# Patient Record
Sex: MALE | Race: WHITE | NOT HISPANIC OR LATINO | Employment: STUDENT | URBAN - METROPOLITAN AREA
[De-identification: names, ages, dates, MRNs, and addresses within clinical notes are randomized per-mention and may not be internally consistent; named-entity substitution may affect disease eponyms.]

---
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
## 2023-03-08 ENCOUNTER — NURSE TRIAGE (OUTPATIENT)
Dept: NURSING | Facility: CLINIC | Age: 18
End: 2023-03-08
Payer: OTHER GOVERNMENT

## 2023-03-08 ENCOUNTER — APPOINTMENT (OUTPATIENT)
Dept: URGENT CARE | Facility: URGENT CARE | Age: 18
End: 2023-03-08
Attending: PHYSICIAN ASSISTANT
Payer: OTHER GOVERNMENT

## 2023-03-08 ENCOUNTER — VIRTUAL VISIT (OUTPATIENT)
Dept: FAMILY MEDICINE | Facility: CLINIC | Age: 18
End: 2023-03-08
Payer: OTHER GOVERNMENT

## 2023-03-08 DIAGNOSIS — R07.0 THROAT PAIN: Primary | ICD-10-CM

## 2023-03-08 PROBLEM — M25.512 SHOULDER PAIN, LEFT: Status: ACTIVE | Noted: 2022-08-01

## 2023-03-08 PROBLEM — J38.3 VOCAL CORD DYSFUNCTION: Status: ACTIVE | Noted: 2021-09-13

## 2023-03-08 PROBLEM — M25.551 RIGHT HIP PAIN: Status: ACTIVE | Noted: 2020-11-10

## 2023-03-08 LAB
DEPRECATED S PYO AG THROAT QL EIA: NEGATIVE
GROUP A STREP BY PCR: NOT DETECTED

## 2023-03-08 PROCEDURE — 99213 OFFICE O/P EST LOW 20 MIN: CPT | Mod: VID | Performed by: PHYSICIAN ASSISTANT

## 2023-03-08 PROCEDURE — 87651 STREP A DNA AMP PROBE: CPT | Mod: VID | Performed by: PHYSICIAN ASSISTANT

## 2023-03-08 ASSESSMENT — ENCOUNTER SYMPTOMS
NECK STIFFNESS: 0
RESPIRATORY NEGATIVE: 1
SORE THROAT: 1
ACTIVITY CHANGE: 1
CARDIOVASCULAR NEGATIVE: 1
TROUBLE SWALLOWING: 0
NAUSEA: 1
NECK PAIN: 0
FEVER: 1

## 2023-03-08 NOTE — PROGRESS NOTES
Tien is a 17 year old who is being evaluated via a billable video visit.      How would you like to obtain your AVS? MyChart  If the video visit is dropped, the invitation should be resent by: Text to cell phone: [phone]  Will anyone else be joining your video visit? No        Assessment & Plan     Throat pain  Antibiotic therapy with Tylenol ibuprofen lots of fluids warm salt water gargles we will treat if positive for strep  - Streptococcus A Rapid Screen w/Reflex to PCR - Clinic Collect  Results for orders placed or performed in visit on 03/08/23   Streptococcus A Rapid Screen w/Reflex to PCR - Clinic Collect     Status: Normal    Specimen: Throat; Swab   Result Value Ref Range    Group A Strep antigen Negative Negative                      No follow-ups on file.    JAYJAY Naranjo  [facility]    Brianne Chauhan is a 17 year old, presenting for the following health issues:  Pharyngitis      17-year-old [employer] student presents virtually for sore throat she had a sore throat the last 24 to 36 hours she had a low-grade temperature most recently a little over 99 but it is with ibuprofen on board he was nauseous earlier not now no severe headache no rash no stiff neck had strep in the past and this feels similar no known exposure to strep that he is aware of no known COVID exposure no other respiratory symptoms she is requesting strep testing    History of Present Illness       Reason for visit:  Sore Throat and Fever  Symptom onset:  1-3 days ago  Symptoms include:  Sore Throat, Fever, Nausea, Throat Pain, Flu like symptoms  Symptom intensity:  Moderate  Symptom progression:  Worsening  Had these symptoms before:  Yes  Has tried/received treatment for these symptoms:  Yes  Previous treatment was successful:  Yes  Prior treatment description:  I had past issues with throat pain/sore throat (step, bacterial infection, viral infection). Medication I took was  used to combat bacteria infection  What makes it worse:  N/a  What makes it better:  Ibuprofen, pain/fever medication    He eats 2-3 servings of fruits and vegetables daily.He consumes 1 sweetened beverage(s) daily.He exercises with enough effort to increase his heart rate 30 to 60 minutes per day.  He exercises with enough effort to increase his heart rate 4 days per week.   He is taking medications regularly.       Onset: yesterday  Description: Sore throat, painful with swallowing, chills, fever, post nasal drainage  Intensity: moderate  Progression of Symptoms:  worsening  Therapies tried and outcome: ibuprofen, hydration, soft foods.        Review of Systems   Constitutional: Positive for activity change and fever.   HENT: Positive for sore throat. Negative for trouble swallowing.    Respiratory: Negative.    Cardiovascular: Negative.    Gastrointestinal: Positive for nausea.   Musculoskeletal: Negative for neck pain and neck stiffness.   Skin: Negative for rash.            Objective           Vitals:  No vitals were obtained today due to virtual visit.    Physical Exam   GENERAL: Healthy, alert and no distress  EYES: Eyes grossly normal to inspection.  No discharge or erythema, or obvious scleral/conjunctival abnormalities.  RESP: No audible wheeze, cough, or visible cyanosis.  No visible retractions or increased work of breathing.    SKIN: Visible skin clear. No significant rash, abnormal pigmentation or lesions.  NEURO: Cranial nerves grossly intact.  Mentation and speech appropriate for age.  PSYCH: Mentation appears normal, affect normal/bright, judgement and insight intact, normal speech and appearance well-groomed.                Video-Visit Details    Type of service:  Video Visit     Originating Location (pt. Location): Home    Distant Location (provider location):  On-site  Platform used for Video Visit: Visualant

## 2023-03-08 NOTE — TELEPHONE ENCOUNTER
"Patient calling reporting symptoms starting yesterday with a sore throat and fatigue.    Patient denies known exposure to COVID 19, influenza, strep.    Reporting history of strep with similar symptoms. Patient is requesting strep testing.    Sore throat pain rating  \"6-8\" on 1-10 pain scale. Ibuprofen and Tylenol \"helps a little.\"    Temp 99.2 (O) during triage, last dose of fever reducing medication taken at 1 a.m.    Patient agrees to complete My Chart Visit now with next available provider.    Caller verbalized understanding. Denies further questions. Connected to My Chart Support.    Brissa Ceron RN  [facility]       Reason for Disposition    Patient requesting a strep throat test    Additional Information    Negative: SEVERE difficulty breathing (e.g., struggling for each breath, speaks in single words)    Negative: Sounds like a life-threatening emergency to the triager    Negative: Drooling or spitting out saliva (because can't swallow)    Negative: Unable to open mouth completely    Negative: Drinking very little and has signs of dehydration (e.g., no urine > 12 hours, very dry mouth, very lightheaded)    Negative: Patient sounds very sick or weak to the triager    Negative: Difficulty breathing (per caller) but not severe    Negative: Fever > 103 F (39.4 C)    Negative: Refuses to drink anything for > 12 hours    Negative: SEVERE sore throat pain    Negative: Pus on tonsils (back of throat) and swollen neck lymph nodes ('glands')    Negative: Earache also present    Negative: Widespread rash (especially chest and abdomen)    Negative: Diabetes mellitus or weak immune system (e.g., HIV positive, cancer chemo, splenectomy, organ transplant, chronic steroids)    Negative: History of rheumatic fever    Negative: Patient wants to be seen    Negative: Fever present > 3 days (72 hours)    Protocols used: SORE THROAT-A-OH        "

## 2023-03-10 ENCOUNTER — OFFICE VISIT (OUTPATIENT)
Dept: FAMILY MEDICINE | Facility: CLINIC | Age: 18
End: 2023-03-10
Payer: OTHER GOVERNMENT

## 2023-03-10 VITALS
HEART RATE: 71 BPM | SYSTOLIC BLOOD PRESSURE: 116 MMHG | TEMPERATURE: 97.7 F | WEIGHT: 171 LBS | HEIGHT: 72 IN | DIASTOLIC BLOOD PRESSURE: 74 MMHG | BODY MASS INDEX: 23.16 KG/M2 | OXYGEN SATURATION: 98 %

## 2023-03-10 DIAGNOSIS — H11.31 SUBCONJUNCTIVAL HEMORRHAGE OF RIGHT EYE: ICD-10-CM

## 2023-03-10 DIAGNOSIS — J03.90 TONSILLITIS: Primary | ICD-10-CM

## 2023-03-10 PROCEDURE — 99213 OFFICE O/P EST LOW 20 MIN: CPT | Performed by: INTERNAL MEDICINE

## 2023-03-10 RX ORDER — AMOXICILLIN 500 MG/1
500 CAPSULE ORAL 2 TIMES DAILY
Qty: 20 CAPSULE | Refills: 0 | Status: SHIPPED | OUTPATIENT
Start: 2023-03-10 | End: 2023-03-20

## 2023-03-10 ASSESSMENT — ENCOUNTER SYMPTOMS
TROUBLE SWALLOWING: 1
COUGH: 0
SORE THROAT: 1
VOMITING: 0
SINUS PAIN: 0
CHILLS: 0
SHORTNESS OF BREATH: 0
EYE PAIN: 0
HEADACHES: 0
NAUSEA: 0
MYALGIAS: 0
DIARRHEA: 0
FATIGUE: 0
EYE DISCHARGE: 0
SINUS PRESSURE: 1
FEVER: 0

## 2023-03-10 ASSESSMENT — ASTHMA QUESTIONNAIRES
QUESTION_4 LAST FOUR WEEKS HOW OFTEN HAVE YOU USED YOUR RESCUE INHALER OR NEBULIZER MEDICATION (SUCH AS ALBUTEROL): NOT AT ALL
QUESTION_3 LAST FOUR WEEKS HOW OFTEN DID YOUR ASTHMA SYMPTOMS (WHEEZING, COUGHING, SHORTNESS OF BREATH, CHEST TIGHTNESS OR PAIN) WAKE YOU UP AT NIGHT OR EARLIER THAN USUAL IN THE MORNING: NOT AT ALL
QUESTION_2 LAST FOUR WEEKS HOW OFTEN HAVE YOU HAD SHORTNESS OF BREATH: NOT AT ALL
ACT_TOTALSCORE: 24
QUESTION_5 LAST FOUR WEEKS HOW WOULD YOU RATE YOUR ASTHMA CONTROL: WELL CONTROLLED
ACT_TOTALSCORE: 24
QUESTION_1 LAST FOUR WEEKS HOW MUCH OF THE TIME DID YOUR ASTHMA KEEP YOU FROM GETTING AS MUCH DONE AT WORK, SCHOOL OR AT HOME: NONE OF THE TIME

## 2023-03-10 NOTE — PROGRESS NOTES
Assessment & Plan     Tonsillitis  Patient with 3-day illness which is improving.  Has had recurrent tonsillitis.  Continue supportive care.  Rosa the risks of antibiotic therapy including allergy and C. difficile return if not better.  - amoxicillin (AMOXIL) 500 MG capsule; Take 1 capsule (500 mg) by mouth 2 times daily for 10 days    Subconjunctival hemorrhage of right eye  Thinks it was due to the 2 episodes of vomiting he had with the onset of his illness.  No visual impairment or pain.                   No follow-ups on file.    Carlos Chowdary MD  [facility]    Brianne Chauhan is a 17 year old accompanied by his self, presenting for the following health issues:  Pharyngitis (Negative strep yesterday, a lot of drainage in back of throat), Ear Problem (Pressure in R ear ), and Eye Problem (R eye redness )      HPI   Patient was well until 3 days ago when he developed fever chills pharyngitis nasal congestion postnasal drainage right ear discomfort.  Minimal cough with postnasal drip.  Fever and chills resolved as did the vomiting after 2 episodes.  He is actually feeling better.  Has had recurrent tonsillitis on a couple of occasions.      Review of Systems   Constitutional: Negative for chills, fatigue and fever.   HENT: Positive for ear pain, postnasal drip, sinus pressure, sore throat and trouble swallowing. Negative for congestion and sinus pain.    Eyes: Negative for pain, discharge and visual disturbance.   Respiratory: Negative for cough and shortness of breath.    Cardiovascular: Negative for chest pain and peripheral edema.   Gastrointestinal: Negative for diarrhea, nausea and vomiting.   Musculoskeletal: Negative for myalgias.   Neurological: Negative for headaches.            Objective    /74 (BP Location: Right arm)   Pulse 71   Temp 97.7  F (36.5  C)   Ht 1.829 m (6')   Wt 77.6 kg (171 lb)   SpO2 98%   BMI 23.19 kg/m    Body mass index is 23.19  kg/m .  Physical Exam   Healthy-appearing young man in no distress.  Appears comfortable.  Eyes reveals some conjunctival hemorrhage right lower lateral with intact pupillary reactions and EOMI.  HEENT exam reveals little bit of exudate on the tonsils which are not particularly erythematous.  TMs without erythema.  Sinuses nontender.  Lungs clear.

## 2023-05-21 ENCOUNTER — HEALTH MAINTENANCE LETTER (OUTPATIENT)
Age: 18
End: 2023-05-21

## 2023-10-22 ENCOUNTER — HEALTH MAINTENANCE LETTER (OUTPATIENT)
Age: 18
End: 2023-10-22

## 2024-02-02 ENCOUNTER — LAB (OUTPATIENT)
Dept: LAB | Facility: CLINIC | Age: 19
End: 2024-02-02
Attending: PHYSICIAN ASSISTANT
Payer: OTHER GOVERNMENT

## 2024-02-02 ENCOUNTER — VIRTUAL VISIT (OUTPATIENT)
Dept: FAMILY MEDICINE | Facility: CLINIC | Age: 19
End: 2024-02-02
Payer: OTHER GOVERNMENT

## 2024-02-02 DIAGNOSIS — J02.9 SORE THROAT: Primary | ICD-10-CM

## 2024-02-02 DIAGNOSIS — J02.9 SORE THROAT: ICD-10-CM

## 2024-02-02 LAB
DEPRECATED S PYO AG THROAT QL EIA: NEGATIVE
GROUP A STREP BY PCR: NOT DETECTED

## 2024-02-02 PROCEDURE — 99213 OFFICE O/P EST LOW 20 MIN: CPT | Mod: 95 | Performed by: PHYSICIAN ASSISTANT

## 2024-02-02 PROCEDURE — 87651 STREP A DNA AMP PROBE: CPT

## 2024-02-02 NOTE — RESULT ENCOUNTER NOTE
Dear Tien    Both your rapid and culture were negative for step.  If symptoms persist or worsen over the weekend, you may want to hop into an urgent care, as mono can have very similar symptoms to strep.  Otherwise, would continue tylenol, motrin, salt water gargles and rest.    Grupo Cerna PA-C

## 2024-02-02 NOTE — PROGRESS NOTES
Tien is a 18 year old who is being evaluated via a billable video visit.      How would you like to obtain your AVS? MyChart  If the video visit is dropped, the invitation should be resent by: Send to e-mail at: [email or web address]  Will anyone else be joining your video visit? No          Assessment & Plan     Sore throat  Negative strep.  Supportive care                  Subjective   Tien is a 18 year old, presenting for the following health issues:  Pharyngitis        2/2/2024     9:44 AM   Additional Questions   Roomed by Amanda     hospitals     Acute Illness  Acute illness concerns: Pharyngitis   Onset/Duration: 2/1/23  Symptoms:  Fever: No  Chills/Sweats: YES  Headache (location?): No  Sinus Pressure: No  Conjunctivitis:  No  Ear Pain: no  Rhinorrhea: No  Congestion: No  Sore Throat: YES  Cough: no  Wheeze: No  Decreased Appetite: No  Nausea: YES  Vomiting: YES  Diarrhea: No  Dysuria/Freq.: No  Dysuria or Hematuria: No  Fatigue/Achiness: YES  Sick/Strep Exposure: YES- School   Therapies tried and outcome: None        Review of Systems  Constitutional, HEENT, cardiovascular, pulmonary, gi and gu systems are negative, except as otherwise noted.      Objective           Vitals:  No vitals were obtained today due to virtual visit.    Physical Exam   GENERAL: alert and no distress  EYES: Eyes grossly normal to inspection.  No discharge or erythema, or obvious scleral/conjunctival abnormalities.  RESP: No audible wheeze, cough, or visible cyanosis.    SKIN: Visible skin clear. No significant rash, abnormal pigmentation or lesions.  NEURO: Cranial nerves grossly intact.  Mentation and speech appropriate for age.  PSYCH: Appropriate affect, tone, and pace of words    Results for orders placed or performed in visit on 02/02/24   Streptococcus A Rapid Screen w/Reflex to PCR     Status: Normal    Specimen: Throat; Swab   Result Value Ref Range    Group A Strep antigen Negative Negative   Group A Streptococcus PCR  Throat Swab     Status: Normal    Specimen: Throat; Swab   Result Value Ref Range    Group A strep by PCR Not Detected Not Detected    Narrative    The Xpert Xpress Strep A test, performed on the [employer], is a rapid, qualitative in vitro diagnostic test for the detection of Streptococcus pyogenes (Group A ß-hemolytic Streptococcus, Strep A) in throat swab specimens from patients with signs and symptoms of pharyngitis. The Xpert Xpress Strep A test can be used as an aid in the diagnosis of Group A Streptococcal pharyngitis. The assay is not intended to monitor treatment for Group A Streptococcus infections. The Xpert Xpress Strep A test utilizes an automated real-time polymerase chain reaction (PCR) to detect Streptococcus pyogenes DNA.         Video-Visit Details    Type of service:  Video Visit     Originating Location (pt. Location): Home    Distant Location (provider location):  On-site  Platform used for Video Visit: [employer]  Signed Electronically by: Carlos Cerna PA-C

## 2024-12-15 ENCOUNTER — HEALTH MAINTENANCE LETTER (OUTPATIENT)
Age: 19
End: 2024-12-15